# Patient Record
Sex: FEMALE | Race: WHITE | NOT HISPANIC OR LATINO | Employment: FULL TIME | URBAN - METROPOLITAN AREA
[De-identification: names, ages, dates, MRNs, and addresses within clinical notes are randomized per-mention and may not be internally consistent; named-entity substitution may affect disease eponyms.]

---

## 2024-10-08 ENCOUNTER — OFFICE VISIT (OUTPATIENT)
Dept: URGENT CARE | Facility: CLINIC | Age: 44
End: 2024-10-08
Payer: COMMERCIAL

## 2024-10-08 VITALS
WEIGHT: 210 LBS | TEMPERATURE: 98 F | HEART RATE: 84 BPM | HEIGHT: 64 IN | OXYGEN SATURATION: 97 % | RESPIRATION RATE: 18 BRPM | DIASTOLIC BLOOD PRESSURE: 96 MMHG | SYSTOLIC BLOOD PRESSURE: 132 MMHG | BODY MASS INDEX: 35.85 KG/M2

## 2024-10-08 DIAGNOSIS — J02.9 ACUTE PHARYNGITIS, UNSPECIFIED ETIOLOGY: Primary | ICD-10-CM

## 2024-10-08 DIAGNOSIS — J02.9 SORE THROAT: ICD-10-CM

## 2024-10-08 LAB
CTP QC/QA: YES
MOLECULAR STREP A: NEGATIVE

## 2024-10-08 PROCEDURE — 99213 OFFICE O/P EST LOW 20 MIN: CPT | Mod: ,,, | Performed by: FAMILY MEDICINE

## 2024-10-08 PROCEDURE — 87651 STREP A DNA AMP PROBE: CPT | Mod: QW,,, | Performed by: FAMILY MEDICINE

## 2024-10-08 NOTE — PROGRESS NOTES
"Subjective:      Patient ID: Tessa Kwok is a 44 y.o. female.    Vitals:  height is 5' 4" (1.626 m) and weight is 95.3 kg (210 lb). Her oral temperature is 97.8 °F (36.6 °C). Her blood pressure is 132/96 (abnormal) and her pulse is 84. Her respiration is 18 and oxygen saturation is 97%.     Chief Complaint: Sore Throat     Patient is a 44 y.o. female who presents to urgent care with complaints of sore throat x 2 days. Exposed to strep. Patient denies fever, cough, chills, body aches, rash.      Sore Throat       HENT:  Positive for sore throat.       Torres Martinez:  44-year-old female otherwise healthy present to clinic with concerns of sore throat since 2 days.  No fever, no swollen neck glands.  Positive exposure to strep.    ROS:  Constitutional : _ no fever, no body aches or headache  Eyes : _No redness, drainage or pain  HENT_sore throat, postnasal drainage  Respiratory_no wheezing, no shortness of breath  Cardiovascular_no chest pain  Gastrointestinal_ No vomiting, No diarrhea, No abdominal pain  Musculoskeletal_no joint pain, no joint swelling  Integumentary_no skin rash     Objective:     Physical Exam  General : Alert and Oriented, No apparent distress, afebrile  Neck - supple, shotty anterior cervical lymph nodes nontender to palpate, not enlarged  HENT : Oropharynx mild redness no swelling, tonsils absent, bilateral TMs intact mild fluid no redness.   Respiratory : Bilateral equal breath sounds, nonlabored respirations  Cardiovascular : Rate, rhythm regular, normal volume pulse, no murmur  Gastrointestinal: Full abdomen, soft, nontender to palpate  Integumentary : Warm, Dry and no rash    Assessment:     1. Acute pharyngitis, unspecified etiology    2. Sore throat      Plan:   Discussed the physical finding, concerns of allergies or viral etiology.  Monitor the symptoms.  Strep test negative  Warm saltwater gargles for sore throat.  Alternate Tylenol ibuprofen for pain and discomfort.  For worsening symptoms call " clinic will consider prednisone    Acute pharyngitis, unspecified etiology    Sore throat  -     POCT Strep A, Molecular

## 2024-10-08 NOTE — PATIENT INSTRUCTIONS
Discussed the physical finding, concerns of allergies or viral etiology.  Monitor the symptoms.  Strep test negative  Warm saltwater gargles for sore throat.  Alternate Tylenol ibuprofen for pain and discomfort.  For worsening symptoms call clinic will consider prednisone

## 2025-02-11 ENCOUNTER — TELEPHONE (OUTPATIENT)
Dept: HEMATOLOGY/ONCOLOGY | Facility: CLINIC | Age: 45
End: 2025-02-11
Payer: COMMERCIAL

## 2025-02-11 DIAGNOSIS — K76.9 LIVER LESION: ICD-10-CM

## 2025-02-11 DIAGNOSIS — Z17.0 MALIGNANT NEOPLASM OF RIGHT BREAST IN FEMALE, ESTROGEN RECEPTOR POSITIVE, UNSPECIFIED SITE OF BREAST: Primary | ICD-10-CM

## 2025-02-11 DIAGNOSIS — C50.911 MALIGNANT NEOPLASM OF RIGHT BREAST IN FEMALE, ESTROGEN RECEPTOR POSITIVE, UNSPECIFIED SITE OF BREAST: Primary | ICD-10-CM

## 2025-02-11 NOTE — TELEPHONE ENCOUNTER
----- Message from Shante sent at 2/11/2025  1:32 PM CST -----  Regarding: FW: Schedule  Pt last seen 2023. Scheduled EOV 3/3. Pt wants to do labs prior.  Please advise  ----- Message -----  From: Xin Ballesteros  Sent: 2/11/2025  10:58 AM CST  To: [facility]  Subject: Schedule                                         Dr. Jasmine patient - needs to schedule labs and office visit.

## 2025-02-24 ENCOUNTER — LAB VISIT (OUTPATIENT)
Dept: LAB | Facility: HOSPITAL | Age: 45
End: 2025-02-24
Attending: INTERNAL MEDICINE
Payer: COMMERCIAL

## 2025-02-24 DIAGNOSIS — Z17.0 MALIGNANT NEOPLASM OF RIGHT BREAST IN FEMALE, ESTROGEN RECEPTOR POSITIVE, UNSPECIFIED SITE OF BREAST: ICD-10-CM

## 2025-02-24 DIAGNOSIS — K76.9 LIVER LESION: ICD-10-CM

## 2025-02-24 DIAGNOSIS — C50.911 MALIGNANT NEOPLASM OF RIGHT BREAST IN FEMALE, ESTROGEN RECEPTOR POSITIVE, UNSPECIFIED SITE OF BREAST: ICD-10-CM

## 2025-02-24 LAB
ALBUMIN SERPL-MCNC: 4.1 G/DL (ref 3.5–5)
ALBUMIN/GLOB SERPL: 1.2 RATIO (ref 1.1–2)
ALP SERPL-CCNC: 60 UNIT/L (ref 40–150)
ALT SERPL-CCNC: 41 UNIT/L (ref 0–55)
ANION GAP SERPL CALC-SCNC: 8 MEQ/L
AST SERPL-CCNC: 26 UNIT/L (ref 5–34)
BASOPHILS # BLD AUTO: 0.02 X10(3)/MCL
BASOPHILS NFR BLD AUTO: 0.5 %
BILIRUB SERPL-MCNC: 0.6 MG/DL
BUN SERPL-MCNC: 13.5 MG/DL (ref 7–18.7)
CALCIUM SERPL-MCNC: 9.3 MG/DL (ref 8.4–10.2)
CHLORIDE SERPL-SCNC: 105 MMOL/L (ref 98–107)
CO2 SERPL-SCNC: 26 MMOL/L (ref 22–29)
CREAT SERPL-MCNC: 0.72 MG/DL (ref 0.55–1.02)
CREAT/UREA NIT SERPL: 19
EOSINOPHIL # BLD AUTO: 0.09 X10(3)/MCL (ref 0–0.9)
EOSINOPHIL NFR BLD AUTO: 2.3 %
ERYTHROCYTE [DISTWIDTH] IN BLOOD BY AUTOMATED COUNT: 12.8 % (ref 11.5–17)
GFR SERPLBLD CREATININE-BSD FMLA CKD-EPI: >60 ML/MIN/1.73/M2
GLOBULIN SER-MCNC: 3.4 GM/DL (ref 2.4–3.5)
GLUCOSE SERPL-MCNC: 96 MG/DL (ref 74–100)
HCT VFR BLD AUTO: 45.6 % (ref 37–47)
HGB BLD-MCNC: 14.9 G/DL (ref 12–16)
IMM GRANULOCYTES # BLD AUTO: 0 X10(3)/MCL (ref 0–0.04)
IMM GRANULOCYTES NFR BLD AUTO: 0 %
LYMPHOCYTES # BLD AUTO: 1.48 X10(3)/MCL (ref 0.6–4.6)
LYMPHOCYTES NFR BLD AUTO: 37.9 %
MCH RBC QN AUTO: 28.3 PG (ref 27–31)
MCHC RBC AUTO-ENTMCNC: 32.7 G/DL (ref 33–36)
MCV RBC AUTO: 86.5 FL (ref 80–94)
MONOCYTES # BLD AUTO: 0.34 X10(3)/MCL (ref 0.1–1.3)
MONOCYTES NFR BLD AUTO: 8.7 %
NEUTROPHILS # BLD AUTO: 1.97 X10(3)/MCL (ref 2.1–9.2)
NEUTROPHILS NFR BLD AUTO: 50.6 %
PLATELET # BLD AUTO: 227 X10(3)/MCL (ref 130–400)
PMV BLD AUTO: 9.5 FL (ref 7.4–10.4)
POTASSIUM SERPL-SCNC: 3.8 MMOL/L (ref 3.5–5.1)
PROT SERPL-MCNC: 7.5 GM/DL (ref 6.4–8.3)
RBC # BLD AUTO: 5.27 X10(6)/MCL (ref 4.2–5.4)
SODIUM SERPL-SCNC: 139 MMOL/L (ref 136–145)
WBC # BLD AUTO: 3.9 X10(3)/MCL (ref 4.5–11.5)

## 2025-02-24 PROCEDURE — 85025 COMPLETE CBC W/AUTO DIFF WBC: CPT

## 2025-02-24 PROCEDURE — 36415 COLL VENOUS BLD VENIPUNCTURE: CPT

## 2025-02-24 PROCEDURE — 86300 IMMUNOASSAY TUMOR CA 15-3: CPT

## 2025-02-24 PROCEDURE — 80053 COMPREHEN METABOLIC PANEL: CPT

## 2025-02-25 LAB — CANCER AG27-29 SERPL-ACNC: 26.7 U/ML

## 2025-02-25 NOTE — PROGRESS NOTES
HEMATOLOGY/ONCOLOGY OFFICE CLINIC VISIT    US abdomen 06/27/2022 Cancelled  Office visit 06/30/2022  Cancelled  US abdomen 08/02/2022 Cancelled  Office visit 08/04/2022  Cancelled      Visit Information:    Initial Consultation:7/20/16  Referring Physician:Dr Maxwell  Code Status:FULL  Other Physicians: Dr Weathers (PCP),  Dr Tatum (GYN)    Diagnosis/Problem list:   Stage I-T1N0M0- Breast cancer, tumor 0.8 cm, Er 5%, NV 1%, Pet0Wmn +, 0/4 LN+ (Dx 2011 at the age of 31)   -DCIS componenet grade 3/3   -BRCA 1/2 neg    Present Treatment:  Surveillance    Oncology/Treatment history:    1) Bilateral mastectomy 6/27/11  2) adjuvant chemotherapy: CEFx 4 (8/24/11- 10/26/11)-->Taxol/Herceptin (11/16/11- 1/25/12) x 12 weeks--> Herceptin q 3 week 2/22/12- 11/21/12--> Reconstruction (11/29/12)  3) Endocrine therapy: Tamoxifen (2/22/2012-2014)  4) XRT completed 4/30/2012  5) Tamoxifen (2/22/2012-2013)--patient stopped it and declined further endocrine therapy  6) total  hysterectomy and BSO on 1/15/2020     Plan of care: CXR q year, tumor markers and visits with physical exam and labs every 12 months       Imaging:  Right breast US 12/7/2020:  Right breast 12 o'clock position 8 cm from the needle, area of concern correlates this according to patient pectoralis major muscle that is stretch taught with her arm in abduction.  These findings are benign.  Postsurgical changes related to prior right mastectomy and implant reconstruction.  It was read BI-RADS Category 2: Benign. No routine screening of her reconstructed breast recommended.   Abdominal ultrasound 3/10/2022: Changes of hepatic steatosis. Hypoechoic lesion in the left lobe of the liver although it might be related to focal fatty sparing other entities cannot be completely excluded other imaging modalities might prove helpful for further assessment   MRI Abdomen W W/O Contrast 3/30/2022 3/30/2022: Enhancing 14 mm lesion in the lateral left liver likely corresponds with the  abnormality on recent ultrasound, but has nonspecific features. Given how well this lesion was visualized by ultrasound, a three-month follow-up abdominal ultrasound is recommended for close surveillance.    Pathology:  5/4/2011   Breast bx: DCIS, grade 3/3.   06/27/2011:  Bilateral mastectomies:   right breast showed a 0.8 cm invasive ductal carcinoma with extensive DCIS.  All 4 lymph nodes were negative for metastatic disease.  Ki-67 was 46%, estrogen receptor 5%, progesterone receptor 1% and HER-2/ben positive by IHC and amplified 6.4 by FISH.    08/06/2015:  ENDOMETRIAL BIOPSY: PROLIFERATIVE PHASE ENDOMETRIAL TISSUE, NO EVIDENCE OF HYPERPLASIA  10/03/2019:  ENDOMETRIAL BIOPSY: FRAGMENTS OF SECRETORY PHASE ENDOMETRIUM. NO EVIDENCE OF HYPERPLASIA, NEOPLASIA OR CHRONIC ENDOMETRITIS.    CLINICAL HISTORY:       Patient:  Ms Kwok was diagnosed with breast cancer in 2011 at the age of 31. In 2010, she noticed a lump in her breast but work up was negative.  In Feb, 2011, she notice the lump grew and felt heavy so another work up was persued by Dr. Tatum.  She eventually underwent biopsy on 5/4/2011 that revealed DCIS, grade 3/3.  She then underwent bilateral mastectomy on 6/27/2011 for which the right breast showed a 0.8 cm invasive ductal carcinoma with extensive DCIS.  All 4 lymph nodes were negative for metastatic disease.  Ki-67 was 46%, estrogen receptor 5%, progesterone receptor 1% and HER-2/ben positive by IHC and amplified 6.4 by FISH.      She underwent adjuvant chemotherapy. On 7/22/16 MUGA scan with NORMAL WALL MOTION WITH NORMAL GLOBAL LVEF - 73%. PET/CT 8/8/201 with NO EVIDENCE OF MALIGNANT NEOPLASM. She was initiated on Cytoxan, epirubicin and 5-FU for which she has 4 courses from 8/24/11-10/26/11 followed by weekly Taxol with Herceptin from 11/16/11-1/25/12 for a total of 12 weeks.  Then she was initiated in maintenance Herceptin every 3 weeks that she completed on 11/21/12.  She was initiated on  tamoxifen in February 2012 and she has radiation that was completed and on 4/30/12 by Dr. Schroeder.  She did have reconstruction on 11/29/12.  Genetic testing was performed and she was BRCA negative.    She underwent bilateral breast ultrasound in 4/2015 performed by Dr. Cunningham in her office and was with no evidence of disease.     She has history of bilateral mastectomies with reconstruction.     She has not been taking tamoxifen for > 4 years.  She stopped the medication shortly after she started it due to side effects. She declined any other endocrine therapy.     Patient was seen in July 2017 a year follow was given to her.  She presents 1/16/18  because of left axillary area is swollen and tender.  Ultrasound of the breast and axillary area  1/25/18 with no masses, nodules or suspicious lesions. This was discussed in detail with the patient. At this time no further testing recommended except for close surveillance.         Chief Complaint: Follow-up      Interval History:    12/7/23: Patient presents today for follow up. She was last seen here on 3/31/2022, was supposed to follow up in 3 months with an abdominal US prior. She reports she did not have US done due to cost with past insurance. She now has a new insurance and is willing to have US done.   She is doing well, has no complaints today.  Labs on 12/4/23 reviewed, liver enzymes are normal.    03/03/25: Patient presents today for follow up, last seen 12/7/2023.  She is doing well, has no complaints today. Labs on 02/25/2025 reviewed with pt, liver enzymes are normal. Denies fever, chills, sweats, chest pain or SOB.       ROS: All 14 points ROS taken and positive as per Interval History, all other negative.  Review of Systems   Constitutional:  Negative for chills, fever and weight loss.   HENT:  Negative for congestion and nosebleeds.    Eyes:  Negative for blurred vision, double vision and photophobia.   Respiratory:  Negative for cough, hemoptysis and  "shortness of breath.    Cardiovascular:  Negative for chest pain, palpitations, leg swelling and PND.   Gastrointestinal:  Negative for abdominal pain, blood in stool, constipation, diarrhea, melena, nausea and vomiting.   Genitourinary:  Negative for dysuria, frequency, hematuria and urgency.   Musculoskeletal:  Negative for back pain, falls and myalgias.   Skin:  Negative for itching and rash.   Neurological:  Negative for tremors, focal weakness, seizures, weakness and headaches.   Endo/Heme/Allergies:  Negative for environmental allergies. Does not bruise/bleed easily.   Psychiatric/Behavioral:  Negative for depression and suicidal ideas. The patient is not nervous/anxious.          Histories:  PMH/PSH/FH/SOCIAL/ALLERGIES AND MEDS REVIEWED AND UPDATED AS APPROPRIATE       Vitals:    03/03/25 1455 03/03/25 1458   BP:  (!) 137/96   BP Location:  Left arm   Patient Position:  Sitting   Pulse:  74   Resp:  18   Temp:  98 °F (36.7 °C)   TempSrc:  Oral   SpO2:  98%   Weight: 99.9 kg (220 lb 4.8 oz) 99.9 kg (220 lb 4.8 oz)   Height:  5' 4" (1.626 m)        Wt Readings from Last 6 Encounters:   03/03/25 99.9 kg (220 lb 4.8 oz)   10/08/24 95.3 kg (210 lb)   12/07/23 94.2 kg (207 lb 11.2 oz)   11/28/22 97.5 kg (215 lb)   11/15/22 97.5 kg (215 lb)   05/19/20 85.5 kg (188 lb 7.9 oz)     Body mass index is 37.81 kg/m².  Body surface area is 2.12 meters squared.  Physical Exam  Constitutional:       Appearance: Normal appearance.   HENT:      Head: Normocephalic and atraumatic.   Eyes:      General: No scleral icterus.     Extraocular Movements: Extraocular movements intact.      Conjunctiva/sclera: Conjunctivae normal.   Neck:      Vascular: No JVD.   Cardiovascular:      Rate and Rhythm: Normal rate and regular rhythm.      Heart sounds: No murmur heard.  Pulmonary:      Effort: Pulmonary effort is normal.      Breath sounds: Normal breath sounds. No wheezing or rhonchi.   Chest:   Breasts:     Right: Absent.      Left: " Normal.   Abdominal:      General: Bowel sounds are normal. There is no distension.      Palpations: Abdomen is soft. There is no mass.      Tenderness: There is no abdominal tenderness.   Musculoskeletal:      Cervical back: Neck supple.   Lymphadenopathy:      Head:      Right side of head: No submandibular adenopathy.      Left side of head: No submandibular adenopathy.      Cervical: No cervical adenopathy.      Upper Body:      Right upper body: No supraclavicular or axillary adenopathy.      Left upper body: No supraclavicular or axillary adenopathy.      Lower Body: No right inguinal adenopathy. No left inguinal adenopathy.   Skin:     General: Skin is warm.      Coloration: Skin is not jaundiced.      Findings: No lesion or rash.      Nails: There is no clubbing.   Neurological:      Mental Status: She is alert and oriented to person, place, and time.      Cranial Nerves: Cranial nerves 2-12 are intact.   Psychiatric:         Attention and Perception: Attention normal.         Behavior: Behavior is cooperative.         Judgment: Judgment normal.       ECOG SCORE    0 - Fully active-able to carry on all pre-disease performance without restriction         Laboratory:  CBC with Differential:  Lab Results   Component Value Date    WBC 3.90 (L) 02/24/2025    RBC 5.27 02/24/2025    HGB 14.9 02/24/2025    HCT 45.6 02/24/2025    MCV 86.5 02/24/2025    MCH 28.3 02/24/2025    MCHC 32.7 (L) 02/24/2025    RDW 12.8 02/24/2025     02/24/2025    MPV 9.5 02/24/2025    EOS 0.1 06/18/2024    BASO 0.0 06/18/2024    EOSINOPHIL 2 06/18/2024        CMP:  Sodium   Date Value Ref Range Status   02/24/2025 139 136 - 145 mmol/L Final     Potassium   Date Value Ref Range Status   02/24/2025 3.8 3.5 - 5.1 mmol/L Final     Chloride   Date Value Ref Range Status   02/24/2025 105 98 - 107 mmol/L Final     CO2   Date Value Ref Range Status   02/24/2025 26 22 - 29 mmol/L Final     Blood Urea Nitrogen   Date Value Ref Range Status    02/24/2025 13.5 7.0 - 18.7 mg/dL Final     Creatinine   Date Value Ref Range Status   02/24/2025 0.72 0.55 - 1.02 mg/dL Final     Calcium   Date Value Ref Range Status   02/24/2025 9.3 8.4 - 10.2 mg/dL Final     Albumin   Date Value Ref Range Status   02/24/2025 4.1 3.5 - 5.0 g/dL Final     Bilirubin Total   Date Value Ref Range Status   02/24/2025 0.6 <=1.5 mg/dL Final     ALP   Date Value Ref Range Status   02/24/2025 60 40 - 150 unit/L Final     AST   Date Value Ref Range Status   02/24/2025 26 5 - 34 unit/L Final     ALT   Date Value Ref Range Status   02/24/2025 41 0 - 55 unit/L Final     Estimated GFR-Non    Date Value Ref Range Status   06/27/2022 >60 mls/min/1.73/m2 Final         Assessment:       1. Malignant neoplasm of right breast in female, estrogen receptor positive, unspecified site of breast    2. Morbidly obese        1) Stage I right breast cancer--ER 5%, MN 1%, HER-2 positive--diagnosed in 2011 at the age of 31.   --BRCA 1/2 neg    --Status post bilateral mastectomy followed by adjuvant chemotherapy followed by Herceptin.     --S/p 1-2 years of tamoxifen and she stopped and declined further endocrine therapy.     --She is currently on surveillance.     --AIDEN x 12 yrs  2) Liver lesion  --Liver enzymes normalized  --US abdomen to eval stabiity of liver lesion.  --further workup revealed liver lesion likely hemangioma.          Plan:       Chronic benign leukopenia  RTC in 1 year or earlier if needed with TD/labs 1-3 days prior  Labs: CBC, CMP, CA 27-29    The patient was seen, interviewed and examined. Pertinent lab and radiology studies were reviewed.   The patient was given ample opportunity to ask questions, and to the best of my abilities, all questions answered to satisfaction; patient demonstrated understanding of what we discussed and agreeable to the plan. Pt instructed to call should develop concerning signs/symptoms or have further questions.   Visit today included  increased complexity associated with the care of the episodic problem chronic leukopenia, addressing and managing the longitudinal care of the patient's chronic leukopenia.        Ailyn Jasmine MD  Hematology/Oncology      Professional Services   I, Shante Levi LPN, acted solely as a scribe for and in the presence of Dr. Ailyn Jasmine, who performed these services.

## 2025-03-03 ENCOUNTER — OFFICE VISIT (OUTPATIENT)
Dept: HEMATOLOGY/ONCOLOGY | Facility: CLINIC | Age: 45
End: 2025-03-03
Payer: COMMERCIAL

## 2025-03-03 VITALS
DIASTOLIC BLOOD PRESSURE: 96 MMHG | TEMPERATURE: 98 F | OXYGEN SATURATION: 98 % | HEIGHT: 64 IN | HEART RATE: 74 BPM | SYSTOLIC BLOOD PRESSURE: 137 MMHG | RESPIRATION RATE: 18 BRPM | WEIGHT: 220.31 LBS | BODY MASS INDEX: 37.61 KG/M2

## 2025-03-03 DIAGNOSIS — D72.819 CHRONIC LEUKOPENIA: ICD-10-CM

## 2025-03-03 DIAGNOSIS — Z17.0 MALIGNANT NEOPLASM OF RIGHT BREAST IN FEMALE, ESTROGEN RECEPTOR POSITIVE, UNSPECIFIED SITE OF BREAST: Primary | ICD-10-CM

## 2025-03-03 DIAGNOSIS — C50.911 MALIGNANT NEOPLASM OF RIGHT BREAST IN FEMALE, ESTROGEN RECEPTOR POSITIVE, UNSPECIFIED SITE OF BREAST: Primary | ICD-10-CM

## 2025-03-03 DIAGNOSIS — E66.01 MORBIDLY OBESE: ICD-10-CM

## 2025-03-03 PROCEDURE — 1159F MED LIST DOCD IN RCRD: CPT | Mod: CPTII,S$GLB,, | Performed by: INTERNAL MEDICINE

## 2025-03-03 PROCEDURE — 3008F BODY MASS INDEX DOCD: CPT | Mod: CPTII,S$GLB,, | Performed by: INTERNAL MEDICINE

## 2025-03-03 PROCEDURE — 99999 PR PBB SHADOW E&M-EST. PATIENT-LVL IV: CPT | Mod: PBBFAC,,, | Performed by: INTERNAL MEDICINE

## 2025-03-03 PROCEDURE — G2211 COMPLEX E/M VISIT ADD ON: HCPCS | Mod: S$GLB,,, | Performed by: INTERNAL MEDICINE

## 2025-03-03 PROCEDURE — 3080F DIAST BP >= 90 MM HG: CPT | Mod: CPTII,S$GLB,, | Performed by: INTERNAL MEDICINE

## 2025-03-03 PROCEDURE — 3075F SYST BP GE 130 - 139MM HG: CPT | Mod: CPTII,S$GLB,, | Performed by: INTERNAL MEDICINE

## 2025-03-03 PROCEDURE — 99214 OFFICE O/P EST MOD 30 MIN: CPT | Mod: S$GLB,,, | Performed by: INTERNAL MEDICINE

## 2025-03-04 PROBLEM — D72.819 CHRONIC LEUKOPENIA: Status: ACTIVE | Noted: 2025-03-04
